# Patient Record
Sex: MALE | Race: BLACK OR AFRICAN AMERICAN | Employment: FULL TIME | URBAN - METROPOLITAN AREA
[De-identification: names, ages, dates, MRNs, and addresses within clinical notes are randomized per-mention and may not be internally consistent; named-entity substitution may affect disease eponyms.]

---

## 2021-04-14 ENCOUNTER — HOSPITAL ENCOUNTER (EMERGENCY)
Age: 56
Discharge: HOME | End: 2021-04-14
Payer: COMMERCIAL

## 2021-04-14 VITALS
HEART RATE: 78 BPM | SYSTOLIC BLOOD PRESSURE: 139 MMHG | DIASTOLIC BLOOD PRESSURE: 103 MMHG | OXYGEN SATURATION: 98 % | RESPIRATION RATE: 15 BRPM | TEMPERATURE: 98.2 F

## 2021-04-14 DIAGNOSIS — M62.838 NECK MUSCLE SPASM: Primary | ICD-10-CM

## 2021-04-14 PROCEDURE — 99285 EMERGENCY DEPT VISIT HI MDM: CPT

## 2021-04-14 PROCEDURE — 93010 ELECTROCARDIOGRAM REPORT: CPT | Performed by: INTERNAL MEDICINE

## 2021-04-14 PROCEDURE — 93005 ELECTROCARDIOGRAM TRACING: CPT | Performed by: PHYSICIAN ASSISTANT

## 2021-04-14 RX ORDER — KETOROLAC TROMETHAMINE 30 MG/ML
15 INJECTION, SOLUTION INTRAMUSCULAR; INTRAVENOUS ONCE
Status: DISCONTINUED | OUTPATIENT
Start: 2021-04-14 | End: 2021-04-14

## 2021-04-14 RX ORDER — ORPHENADRINE CITRATE 100 MG/1
100 TABLET, EXTENDED RELEASE ORAL 2 TIMES DAILY
Qty: 20 TABLET | Refills: 0 | Status: SHIPPED | OUTPATIENT
Start: 2021-04-14 | End: 2021-04-24

## 2021-04-14 RX ORDER — KETOROLAC TROMETHAMINE 10 MG/1
10 TABLET, FILM COATED ORAL EVERY 6 HOURS PRN
Qty: 20 TABLET | Refills: 0 | Status: SHIPPED | OUTPATIENT
Start: 2021-04-14 | End: 2022-04-14

## 2021-04-14 NOTE — ED PROVIDER NOTES
Patient Identification  Jayda Trevizo is a 60 y.o. male    Chief Complaint  Neck Pain (since midnight,no injury)      HPI  (History provided by patient)  This is a 60 y.o. male who was brought in by self for chief complaint of neck pain. Onset is acute on chronic, patient states he has had episodes similar to this for quite some time, this episode began around midnight. He was awake when it started. Is located in the left posterior neck. Worse with lifting left arm behind head and with turning head left and right. States it feels tight. Has been trying massage at home without relief. Has not tried any medications. Denies numbness or weakness. No chest pain. No difficulty breathing. No sore throat. REVIEW OF SYSTEMS    Constitutional:  Denies fever, chills  HENT:  Denies sore throat or ear pain. + neck pain  Eyes: Denies vision changes, eye pain. Cardiovascular:  Denies chest pain, syncope  Respiratory:  Denies shortness of breath, cough   GI:  Denies abdominal pain, nausea, vomiting  :  Denies dysuria, discharge  Musculoskeletal:  Denies back pain, joint pain  Skin:  Denies rash, pruritis  Neurologic:  Denies headache, focal weakness, or sensory changes     See HPI and nursing notes for additional information     I have reviewed the following nursing documentation:  Allergies: No Known Allergies    Past medical history:  has a past medical history of Asthma. Past surgical history:  has a past surgical history that includes cyst removal.    Home medications:   Prior to Admission medications    Medication Sig Start Date End Date Taking?  Authorizing Provider   orphenadrine (NORFLEX) 100 MG extended release tablet Take 1 tablet by mouth 2 times daily for 10 days 4/14/21 4/24/21 Yes Darci Gaming PA-C   ketorolac (TORADOL) 10 MG tablet Take 1 tablet by mouth every 6 hours as needed for Pain 4/14/21 4/14/22 Yes Darci Gaming PA-C   naproxen (NAPROSYN) 500 MG tablet Take 1 tablet by mouth 2 times daily 10/26/17   [facility]   Misc. Devices ([facility]) MISC 1 Package by Does not apply route daily 10/26/17   [facility]   albuterol (PROVENTIL HFA;VENTOLIN HFA) 108 (90 BASE) MCG/ACT inhaler Inhale 2 puffs into the lungs every 6 hours as needed. Historical Provider, MD       Social history:  reports that he has never smoked. He has never used smokeless tobacco. He reports that he does not drink alcohol or use drugs. Family history:  History reviewed. No pertinent family history. Exam  BP (!) 139/103   Pulse 78   Temp 98.2 °F (36.8 °C) (Oral)   Resp 15   SpO2 98%   Nursing note and vitals reviewed. Constitutional: Well developed, well nourished. No acute distress. HENT:      Head: Normocephalic and atraumatic. Ears: External ears normal.      Nose: Nose normal.     Mouth: Membrane mucosa moist and pink. No posterior oropharynx erythema or tonsillar edema  Eyes: Anicteric sclera. No discharge, PERRL  Neck: Supple. Trachea midline. Cardiovascular: RRR, no murmurs, rubs, or gallops, radial pulses 2+ bilaterally. Pulmonary/Chest: Effort normal. No respiratory distress. CTAB. No stridor. No wheezes. No rales. Abdominal: Soft. Nontender to palpation. No distension. No guarding, rebound tenderness, or evidence of ascites. : No CVA tenderness. Musculoskeletal: Moves all extremities. No gross deformity. No tenderness palpation of the cervical, thoracic, lumbar spine. There is mild muscle tightness and swelling consistent with muscular spasm noted in the left trapezius, patient's pain is reproduced with palpation of the left trapezius. No rash. Neurological: Alert and oriented to person, place, and time. Normal muscle tone.      - High Sensitivity Neuro Exam Cervical Spine   C1-C4 - Sensation back of head and neck - Intact bilaterally   C5 - Deltoid - 5/5 bilaterally   C6 - Biceps - 5/5 bilaterally   C7 - Extend wrist/fingers - 5/5 bilaterally   C8 - Flex fingers - 5/5 bilaterally   T1 - Abduct fingers - 5/5 bilaterally   Skin: Warm and dry. No rash. Psychiatric: Normal mood and affect. Behavior is normal.    Procedures      EKG   Please see Dr. Mukund Cantu' note for EKG read. Radiographs (if obtained):  [] The following radiograph was interpreted by myself in the absence of a radiologist:   [x] Radiologist's Report Reviewed:  No orders to display          Labs  Results for orders placed or performed during the hospital encounter of 04/14/21   EKG 12 Lead   Result Value Ref Range    Ventricular Rate 66 BPM    Atrial Rate 66 BPM    P-R Interval 162 ms    QRS Duration 102 ms    Q-T Interval 400 ms    QTc Calculation (Bazett) 419 ms    P Axis 57 degrees    R Axis 24 degrees    T Axis 32 degrees    Diagnosis       Normal sinus rhythm  Possible Left atrial enlargement  Borderline ECG  No previous ECGs available           MDM  Patient presents for neck pain. He has had this previously but this is worse than usual and not going away. This appears to be musculoskeletal on exam, he has muscle spasm, pain is worse with range of motion of the neck and lifting of the left arm. He has no chest pain. Given his age and the fact that no trauma was related with his pain we did obtain EKG which shows normal sinus rhythm. Patient declined medication in ED. Requested advised on how to relieve neck pain which was provided including heat therapy, gentle stretching and massage. Was willing to take medications if these fail and was given prescription for Norflex and ketorolac. I estimate there is LOW risk for a ANAPHYLAXIS, ESOPHAGEAL FOREIGN BODY, DEEP SPACE INFECTION, EPIGLOTTITIS, PERITONSILLAR ABSCESS, MENINGITIS, AIRWAY COMPROMISE, ANGIOEDEMA, thus I consider the discharge disposition reasonable. Bonifacio Small and I have discussed the diagnosis and risks, and we agree with discharging home to follow-up with their primary doctor.  We also discussed returning to the Emergency Department immediately if new or worsening symptoms occur. We have discussed the symptoms which are most concerning (e.g., changing or worsening pain, trouble swallowing or breathing, neck stiffness or fever) that necessitate immediate return. I have independently evaluated this patient. Final Impression  1. Neck muscle spasm        Blood pressure (!) 139/103, pulse 78, temperature 98.2 °F (36.8 °C), temperature source Oral, resp. rate 15, SpO2 98 %. Disposition:  Discharge to home in stable condition. Patient was given scripts for the following medications. I counseled patient how to take these medications. New Prescriptions    KETOROLAC (TORADOL) 10 MG TABLET    Take 1 tablet by mouth every 6 hours as needed for Pain    ORPHENADRINE (NORFLEX) 100 MG EXTENDED RELEASE TABLET    Take 1 tablet by mouth 2 times daily for 10 days       This chart was generated using the [address] [place] [address] [address] dictation system. I created this record but it may contain dictation errors given the limitations of this technology.        Frances Williamson PA-C  04/14/21 1707

## 2021-04-14 NOTE — ED PROVIDER NOTES
EKG:   Normal sinus rhythm with a rate of 66. [place] interval 162, , QTc 419. No ST elevations or depressions. Normal T waves. Questionable left atrial enlargement. Impression: Nonspecific EKG. No previous EKGs for comparison.       Ana Lamar MD  04/14/21 2957

## 2021-04-14 NOTE — LETTER
[facility] Emergency Department  [address] [place] [address]  Phone: [phone]  Fax: [phone]             April 14, 2021    Patient: Ara Scott   YOB: 1965   Date of Visit: 4/14/2021       To Whom It May Concern:    Julio Cesar Worley was seen and treated in our emergency department on 4/14/2021. He may return to work on 4/16/21.       Sincerely,     Nely Medina RN        Signature:__________________________________

## 2021-04-16 LAB
EKG ATRIAL RATE: 66 BPM
EKG DIAGNOSIS: NORMAL
EKG P AXIS: 57 DEGREES
EKG P-R INTERVAL: 162 MS
EKG Q-T INTERVAL: 400 MS
EKG QRS DURATION: 102 MS
EKG QTC CALCULATION (BAZETT): 419 MS
EKG R AXIS: 24 DEGREES
EKG T AXIS: 32 DEGREES
EKG VENTRICULAR RATE: 66 BPM

## 2022-01-06 ENCOUNTER — HOSPITAL ENCOUNTER (EMERGENCY)
Age: 57
Discharge: HOME | End: 2022-01-06
Attending: EMERGENCY MEDICINE
Payer: COMMERCIAL

## 2022-01-06 VITALS
RESPIRATION RATE: 16 BRPM | HEART RATE: 87 BPM | SYSTOLIC BLOOD PRESSURE: 152 MMHG | DIASTOLIC BLOOD PRESSURE: 102 MMHG | TEMPERATURE: 99.4 F | OXYGEN SATURATION: 97 %

## 2022-01-06 DIAGNOSIS — R50.9 FEVER, UNSPECIFIED: Primary | ICD-10-CM

## 2022-01-06 LAB
SARS-COV-2, NAAT: NOT DETECTED
SOURCE: NORMAL

## 2022-01-06 PROCEDURE — 99282 EMERGENCY DEPT VISIT SF MDM: CPT

## 2022-01-06 PROCEDURE — 87635 SARS-COV-2 COVID-19 AMP PRB: CPT

## 2022-01-06 ASSESSMENT — ENCOUNTER SYMPTOMS
COUGH: 1
BACK PAIN: 0
ABDOMINAL PAIN: 0
EYE PAIN: 0
SHORTNESS OF BREATH: 0
SINUS PAIN: 0
VOMITING: 0
NAUSEA: 0
RHINORRHEA: 0
EYE DISCHARGE: 0

## 2022-01-06 NOTE — ED TRIAGE NOTES
Pt arrives ambulatory through triage with chief complaint of fever. Pt sts he checked his temp at home and it was 108.0F. Pt temp on arrival 99.4. Pt also complaining of cough. Denies other complaints.

## 2022-01-06 NOTE — ED PROVIDER NOTES
[address] ENCOUNTER      Pt Name: Luz Gr  MRN: [id number]  [place] 1965  Date of evaluation: 1/6/2022  Provider: Alan Steele MD    CHIEF COMPLAINT       Chief Complaint   Patient presents with    Fever     fever at home, temp 99.4 on arrival         [facility]      Luz Gr is a 60 y.o. male who presents to the emergency department  for   Chief Complaint   Patient presents with    Fever     fever at home, temp 99.4 on arrival       60 yom presents reporting fever. Presents with last 2 days he has had a mild cough. He has endorsing congestion. Denies any known sick contacts. Reports he took his temperature at home with multiple thermometers and it rated 100.8. He became concerned to come to the emergency department for evaluation. Denies any dysuria. No abdominal pain. No vomiting or diarrhea. Denies any sputum production. No difficulty breathing. GCS of 15 in the emergency department. No signs of respiratory distress. He was afebrile upon presentation. He reports he did not take any antipyretics at home. He does endorse that he did put ice on his head after he noted the fever. Nursing Notes, Triage Notes & Vital Signs were reviewed. REVIEW OF SYSTEMS    (2-9 systems for level 4, 10 or more for level 5)     Review of Systems   Constitutional: Positive for fever. Negative for chills. HENT: Positive for congestion. Negative for rhinorrhea and sinus pain. Eyes: Negative for pain and discharge. Respiratory: Positive for cough. Negative for shortness of breath. Cardiovascular: Negative for chest pain and palpitations. Gastrointestinal: Negative for abdominal pain, nausea and vomiting. Endocrine: Negative for polydipsia and polyuria. Genitourinary: Negative for dysuria and flank pain. Musculoskeletal: Negative for back pain and neck pain.    Skin: Negative for pallor and wound. Neurological: Negative for dizziness, facial asymmetry, light-headedness, numbness and headaches. Psychiatric/Behavioral: Negative for confusion. Except as noted above the remainder of the review of systems was reviewed and negative. PAST MEDICAL HISTORY     Past Medical History:   Diagnosis Date    Asthma        Prior to Admission medications    Medication Sig Start Date End Date Taking? Authorizing Provider   ketorolac (TORADOL) 10 MG tablet Take 1 tablet by mouth every 6 hours as needed for Pain 4/14/21 4/14/22  Nichelle Gaming PA-C   naproxen (NAPROSYN) 500 MG tablet Take 1 tablet by mouth 2 times daily 10/26/17   [address] Mic[address] Reeves PA-C   Misc. Devices ([facility]) MISC 1 Package by Does not apply route daily 10/26/17   [address] Reeves PA-C   albuterol (PROVENTIL HFA;VENTOLIN HFA) 108 (90 BASE) MCG/ACT inhaler Inhale 2 puffs into the lungs every 6 hours as needed. Historical Provider, MD        Patient Active Problem List   Diagnosis    Sebaceous cyst         SURGICAL HISTORY       Past Surgical History:   Procedure Laterality Date    CYST REMOVAL           CURRENT MEDICATIONS       Previous Medications    ALBUTEROL (PROVENTIL HFA;VENTOLIN HFA) 108 (90 BASE) MCG/ACT INHALER    Inhale 2 puffs into the lungs every 6 hours as needed. KETOROLAC (TORADOL) 10 MG TABLET    Take 1 tablet by mouth every 6 hours as needed for Pain    MISC. DEVICES (CRUTCHES-ALUMINUM) MISC    1 Package by Does not apply route daily    NAPROXEN (NAPROSYN) 500 MG TABLET    Take 1 tablet by mouth 2 times daily       ALLERGIES     Patient has no known allergies. FAMILY HISTORY     History reviewed. No pertinent family history.        SOCIAL HISTORY       Social History     Socioeconomic History    Marital status: Single     Spouse name: None    Number of children: None    Years of education: None    Highest education level: None   Occupational History    None   Tobacco Use    Smoking status: Never Smoker    Smokeless tobacco: Never Used   Substance and Sexual Activity    Alcohol use: No    Drug use: No    Sexual activity: None   Other Topics Concern    None   Social History Narrative    None     Social Determinants of Health     Financial Resource Strain:     Difficulty of Paying Living Expenses: Not on file   Food Insecurity:     Worried About Running Out of Food in the Last Year: Not on file    Connie of Food in the Last Year: Not on file   Transportation Needs:     Lack of Transportation (Medical): Not on file    Lack of Transportation (Non-Medical): Not on file   Physical Activity:     Days of Exercise per Week: Not on file    Minutes of Exercise per Session: Not on file   Stress:     Feeling of Stress : Not on file   Social Connections:     Frequency of Communication with Friends and Family: Not on file    Frequency of Social Gatherings with Friends and Family: Not on file    Attends Gnosticism Services: Not on file    Active Member of [address] [place] [address] or Organizations: Not on file    Attends Club or Organization Meetings: Not on file    Marital Status: Not on file   Intimate Partner Violence:     Fear of Current or Ex-Partner: Not on file    Emotionally Abused: Not on file    Physically Abused: Not on file    Sexually Abused: Not on file   Housing Stability:     Unable to Pay for Housing in the Last Year: Not on file    Number of [place] in the Last Year: Not on file    Unstable Housing in the Last Year: Not on file       SCREENINGS               PHYSICAL EXAM    (up to 7 for level 4, 8 or more for level 5)     ED Triage Vitals [01/06/22 0128]   BP Temp Temp Source Pulse Resp SpO2 Height Weight   (!) 152/102 99.4 °F (37.4 °C) Oral 87 16 97 % -- --       Physical Exam  Vitals reviewed. Constitutional:       Appearance: He is not ill-appearing or toxic-appearing. HENT:      Head: Normocephalic and atraumatic. Nose: No congestion or rhinorrhea. Mouth/Throat:      Mouth: Mucous membranes are moist.      Pharynx: No oropharyngeal exudate or posterior oropharyngeal erythema. Eyes:      General:         Right eye: No discharge. Left eye: No discharge. Extraocular Movements: Extraocular movements intact. Pupils: Pupils are equal, round, and reactive to light. Cardiovascular:      Rate and Rhythm: Normal rate. Heart sounds: No friction rub. No gallop. Pulmonary:      Effort: Pulmonary effort is normal. No respiratory distress. Chest:      Chest wall: No tenderness. Abdominal:      Palpations: Abdomen is soft. Tenderness: There is no abdominal tenderness. There is no guarding. Musculoskeletal:         General: No tenderness. Normal range of motion. Cervical back: Normal range of motion and neck supple. No tenderness. Right lower leg: No edema. Left lower leg: No edema. Lymphadenopathy:      Cervical: No cervical adenopathy. Skin:     General: Skin is warm. Capillary Refill: Capillary refill takes less than 2 seconds. Neurological:      General: No focal deficit present. Mental Status: He is alert and oriented to person, place, and time. DIAGNOSTIC RESULTS     Labs Reviewed   COVID-19, RAPID        RADIOLOGY:     Non-plain film images such as CT, Ultrasound and MRI are read by the radiologist. Plain radiographic images are visualized and preliminarily interpreted by the emergency physician. Interpretation per the Radiologist below, if available at the time of this note:    No orders to display         ED BEDSIDE ULTRASOUND:   Performed by ED Physician Moraima Diaz MD       LABS:  Labs Reviewed   COVID-19, RAPID       All other labs were within normal range or not returned as of this dictation.     EMERGENCY DEPARTMENT COURSE and DIFFERENTIAL DIAGNOSIS/MDM:   Vitals:    Vitals:    01/06/22 0128   BP: (!) 152/102   Pulse: 87   Resp: 16   Temp: 99.4 °F (37.4 °C)   TempSrc: Oral   SpO2: 97%           MDM  Number of Diagnoses or Management Options  Fever, unspecified  Diagnosis management comments: 66-year-old male presents reporting fever. He states that he has had some congestion mild cough for the last few days. Took a temperature at home today and it read 100.8. He came to the emergency department for evaluation. He did endorse applying ice to himself after he noted the high temperature. Presents the emergency department elevated blood pressure. Temperature the emergency department is 99.4. His oxygen saturations are in the high 90s on room air. No signs respiratory distress. We did obtain a Covid-19 test and is negative. Results discussed with patient. We discussed that his symptoms seem consistent with a viral illness. He will continue symptomatic treatment at home for any additional fevers. He is amatory without difficulty. No signs of respiratory distress. He is discharged in stable condition. Amount and/or Complexity of Data Reviewed  Clinical lab tests: reviewed        -  Patient seen and evaluated in the emergency department. -  Triage and nursing notes reviewed and incorporated. -  Old chart records reviewed and incorporated. -  Work-up included:  See above  -  Results discussed with patient. CONSULTS:  None    PROCEDURES:  None performed unless otherwise noted below     Procedures        FINAL IMPRESSION      1. Fever, unspecified          DISPOSITION/PLAN   DISPOSITION Decision To Discharge 01/06/2022 02:37:23 AM      PATIENT REFERRED TO:  Dhiraj Niño MD  [address]  [phone]    Schedule an appointment as soon as possible for a visit in 1 week  As needed      DISCHARGE MEDICATIONS:  New Prescriptions    No medications on file       ED Provider Disposition Time  DISPOSITION Decision To Discharge 01/06/2022 02:37:23 AM      Appropriate personal protective equipment was worn during the patient's evaluation. These included surgical, eye protection, surgical mask or in 95 respirator and gloves. The patient was also placed in a surgical mask for the prevention of possible spread of respiratory viral illnesses. The Patient was instructed to read the package inserts with any medication that was prescribed. Major potential reactions and medication interactions were discussed. The Patient understands that there are numerous possible adverse reactions not covered. The patient was also instructed to arrange follow-up with his or her primary care provider for review of any pending labwork or incidental findings on any radiology results that were obtained. All efforts were made to discuss any incidental findings that require further monitoring. Controlled Substances Monitoring:     No flowsheet data found.     (Please note that portions of this note were completed with a voice recognition program.  Efforts were made to edit the dictations but occasionally words are mis-transcribed.)    Johanna Powell MD (electronically signed)  Attending Emergency Physician           Johanna Powell MD  01/06/22 9992

## 2022-01-08 ENCOUNTER — APPOINTMENT (OUTPATIENT)
Dept: CT IMAGING | Age: 57
End: 2022-01-08
Payer: COMMERCIAL

## 2022-01-08 ENCOUNTER — APPOINTMENT (OUTPATIENT)
Dept: GENERAL RADIOLOGY | Age: 57
End: 2022-01-08
Payer: COMMERCIAL

## 2022-01-08 ENCOUNTER — HOSPITAL ENCOUNTER (EMERGENCY)
Age: 57
Discharge: HOME | End: 2022-01-09
Attending: EMERGENCY MEDICINE
Payer: COMMERCIAL

## 2022-01-08 VITALS
DIASTOLIC BLOOD PRESSURE: 102 MMHG | SYSTOLIC BLOOD PRESSURE: 154 MMHG | HEIGHT: 68 IN | OXYGEN SATURATION: 97 % | RESPIRATION RATE: 16 BRPM | TEMPERATURE: 98 F | WEIGHT: 185 LBS | BODY MASS INDEX: 28.04 KG/M2 | HEART RATE: 84 BPM

## 2022-01-08 DIAGNOSIS — M79.10 MYALGIA: ICD-10-CM

## 2022-01-08 DIAGNOSIS — J40 BRONCHITIS: Primary | ICD-10-CM

## 2022-01-08 DIAGNOSIS — E86.0 DEHYDRATION: ICD-10-CM

## 2022-01-08 DIAGNOSIS — M54.6 ACUTE BILATERAL THORACIC BACK PAIN: ICD-10-CM

## 2022-01-08 LAB
ALBUMIN SERPL-MCNC: 3.7 GM/DL (ref 3.4–5)
ALP BLD-CCNC: 65 IU/L (ref 40–129)
ALT SERPL-CCNC: 29 U/L (ref 10–40)
ANION GAP SERPL CALCULATED.3IONS-SCNC: 12 MMOL/L (ref 4–16)
AST SERPL-CCNC: 28 IU/L (ref 15–37)
BASOPHILS ABSOLUTE: 0 K/CU MM
BASOPHILS RELATIVE PERCENT: 0.5 % (ref 0–1)
BILIRUB SERPL-MCNC: 0.3 MG/DL (ref 0–1)
BUN BLDV-MCNC: 11 MG/DL (ref 6–23)
CALCIUM SERPL-MCNC: 9.1 MG/DL (ref 8.3–10.6)
CHLORIDE BLD-SCNC: 99 MMOL/L (ref 99–110)
CO2: 24 MMOL/L (ref 21–32)
CREAT SERPL-MCNC: 1 MG/DL (ref 0.9–1.3)
DIFFERENTIAL TYPE: ABNORMAL
EOSINOPHILS ABSOLUTE: 0.1 K/CU MM
EOSINOPHILS RELATIVE PERCENT: 1.7 % (ref 0–3)
GFR AFRICAN AMERICAN: >60 ML/MIN/1.73M2
GFR NON-AFRICAN AMERICAN: >60 ML/MIN/1.73M2
GLUCOSE BLD-MCNC: 147 MG/DL (ref 70–99)
HCT VFR BLD CALC: 52 % (ref 42–52)
HEMOGLOBIN: 16.6 GM/DL (ref 13.5–18)
IMMATURE NEUTROPHIL %: 0.2 % (ref 0–0.43)
LACTATE: 2.4 MMOL/L (ref 0.4–2)
LACTATE: 2.7 MMOL/L (ref 0.4–2)
LYMPHOCYTES ABSOLUTE: 1.5 K/CU MM
LYMPHOCYTES RELATIVE PERCENT: 23.2 % (ref 24–44)
MCH RBC QN AUTO: 28.1 PG (ref 27–31)
MCHC RBC AUTO-ENTMCNC: 31.9 % (ref 32–36)
MCV RBC AUTO: 88.1 FL (ref 78–100)
MONOCYTES ABSOLUTE: 0.6 K/CU MM
MONOCYTES RELATIVE PERCENT: 9.6 % (ref 0–4)
NUCLEATED RBC %: 0 %
PDW BLD-RTO: 13 % (ref 11.7–14.9)
PLATELET # BLD: 170 K/CU MM (ref 140–440)
PMV BLD AUTO: 11.5 FL (ref 7.5–11.1)
POTASSIUM SERPL-SCNC: 4.5 MMOL/L (ref 3.5–5.1)
PRO-BNP: 8.14 PG/ML
RAPID INFLUENZA  B AGN: NEGATIVE
RAPID INFLUENZA A AGN: NEGATIVE
RBC # BLD: 5.9 M/CU MM (ref 4.6–6.2)
SEGMENTED NEUTROPHILS ABSOLUTE COUNT: 4.2 K/CU MM
SEGMENTED NEUTROPHILS RELATIVE PERCENT: 64.8 % (ref 36–66)
SODIUM BLD-SCNC: 135 MMOL/L (ref 135–145)
TOTAL IMMATURE NEUTOROPHIL: 0.01 K/CU MM
TOTAL NUCLEATED RBC: 0 K/CU MM
TOTAL PROTEIN: 6.9 GM/DL (ref 6.4–8.2)
TROPONIN T: <0.01 NG/ML
WBC # BLD: 6.5 K/CU MM (ref 4–10.5)

## 2022-01-08 PROCEDURE — 6360000004 HC RX CONTRAST MEDICATION: Performed by: EMERGENCY MEDICINE

## 2022-01-08 PROCEDURE — 71045 X-RAY EXAM CHEST 1 VIEW: CPT

## 2022-01-08 PROCEDURE — 85025 COMPLETE CBC W/AUTO DIFF WBC: CPT

## 2022-01-08 PROCEDURE — 83605 ASSAY OF LACTIC ACID: CPT

## 2022-01-08 PROCEDURE — 99283 EMERGENCY DEPT VISIT LOW MDM: CPT

## 2022-01-08 PROCEDURE — 87804 INFLUENZA ASSAY W/OPTIC: CPT

## 2022-01-08 PROCEDURE — 71275 CT ANGIOGRAPHY CHEST: CPT

## 2022-01-08 PROCEDURE — 2580000003 HC RX 258: Performed by: EMERGENCY MEDICINE

## 2022-01-08 PROCEDURE — 96361 HYDRATE IV INFUSION ADD-ON: CPT

## 2022-01-08 PROCEDURE — 84484 ASSAY OF TROPONIN QUANT: CPT

## 2022-01-08 PROCEDURE — 93005 ELECTROCARDIOGRAM TRACING: CPT | Performed by: PHYSICIAN ASSISTANT

## 2022-01-08 PROCEDURE — 83880 ASSAY OF NATRIURETIC PEPTIDE: CPT

## 2022-01-08 PROCEDURE — 96360 HYDRATION IV INFUSION INIT: CPT

## 2022-01-08 PROCEDURE — 80053 COMPREHEN METABOLIC PANEL: CPT

## 2022-01-08 RX ORDER — 0.9 % SODIUM CHLORIDE 0.9 %
1000 INTRAVENOUS SOLUTION INTRAVENOUS ONCE
Status: COMPLETED | OUTPATIENT
Start: 2022-01-08 | End: 2022-01-08

## 2022-01-08 RX ADMIN — IOPAMIDOL 90 ML: 755 INJECTION, SOLUTION INTRAVENOUS at 22:27

## 2022-01-08 RX ADMIN — SODIUM CHLORIDE 1000 ML: 9 INJECTION, SOLUTION INTRAVENOUS at 19:47

## 2022-01-08 ASSESSMENT — PAIN DESCRIPTION - ORIENTATION: ORIENTATION: RIGHT;LEFT

## 2022-01-08 ASSESSMENT — PAIN SCALES - GENERAL: PAINLEVEL_OUTOF10: 6

## 2022-01-08 ASSESSMENT — PAIN DESCRIPTION - PAIN TYPE: TYPE: ACUTE PAIN

## 2022-01-08 ASSESSMENT — PAIN DESCRIPTION - DESCRIPTORS: DESCRIPTORS: BURNING

## 2022-01-08 ASSESSMENT — PAIN DESCRIPTION - LOCATION: LOCATION: BACK;ARM

## 2022-01-08 NOTE — ED NOTES
Patient unhappy about wait time for continued treatment. Questions this nurse in regards to results and plan of care. Mayra DÍAZ notified and reports plan to update patient. [address]  Marcie Rosales  01/08/22 2769

## 2022-01-08 NOTE — ED PROVIDER NOTES
As provider-in-triage, I performed a medical screening history and physical exam on this patient. HISTORY OF PRESENT ILLNESS  Lory Yanes is a 60 y.o. male who presents for several days of body aches, joint pain, axillary pain, fever. Seen here on 1/6, had neg rapid covid, does not want covid test repeated. PHYSICAL EXAM  There were no vitals taken for this visit. On exam, the patient appears well-hydrated, well-nourished, and in no acute distress. Mucous membranes are moist. Speech is clear. Breathing is unlabored. Skin is dry. Mental status is normal. Moves all extremities, and is without facial droop. Comment: Please note this report has been produced using speech recognition software and may contain errors related to that system including errors in grammar, punctuation, and spelling, as well as words and phrases that may be inappropriate. If there are any questions or concerns please feel free to contact the dictating provider for clarification.        Keshawn Chavez PA-C  01/08/22 9660

## 2022-01-08 NOTE — ED TRIAGE NOTES
Patient complains of burning pain intermittently in back and bilateral arms as well as axillary area.

## 2022-01-08 NOTE — ED PROVIDER NOTES
EKG:   Sinus tachycardia with a rate of 117. IN interval 146, QRS 88, QTc 443. No ST elevations or depressions. Normal T waves. Q waves in the inferior leads. Impression: Abnormal EKG. When compared to previous EKG from 4/4/2021, the tachycardia is new, otherwise no significant changes.      Wendy Rosen MD  01/08/22 2103 Procedure(s):  COLONOSCOPY 20.    total IV anesthesia    Anesthesia Post Evaluation      Multimodal analgesia: multimodal analgesia used between 6 hours prior to anesthesia start to PACU discharge  Patient location during evaluation: bedside  Patient participation: complete - patient participated  Level of consciousness: awake  Pain management: adequate  Airway patency: patent  Anesthetic complications: no  Cardiovascular status: acceptable and stable  Respiratory status: acceptable and room air  Hydration status: acceptable  Post anesthesia nausea and vomiting:  none      INITIAL Post-op Vital signs:   Vitals Value Taken Time   /61 7/3/2020  9:35 AM   Temp 37 °C (98.6 °F) 7/3/2020  9:25 AM   Pulse 70 7/3/2020  9:35 AM   Resp 16 7/3/2020  9:35 AM   SpO2 100 % 7/3/2020  9:35 AM

## 2022-01-09 RX ORDER — PREDNISONE 50 MG/1
50 TABLET ORAL DAILY
Qty: 5 TABLET | Refills: 0 | Status: SHIPPED | OUTPATIENT
Start: 2022-01-09 | End: 2022-01-14

## 2022-01-09 RX ORDER — AZITHROMYCIN 250 MG/1
250 TABLET, FILM COATED ORAL DAILY
Qty: 1 PACKET | Refills: 0 | Status: SHIPPED | OUTPATIENT
Start: 2022-01-09 | End: 2022-01-19

## 2022-01-09 NOTE — ED PROVIDER NOTES
EMERGENCY DEPARTMENT ENCOUNTER      CHIEF COMPLAINT:   Cough  Body aches  Upper back pain      HPI: Shari Cardona is a 60 y.o. male who presents to the emergency department complaining of intermittent fevers, chills, body aches, joint pain, upper back pain and neck cough. The patient was seen here with similar symptoms on 1/6/2021. He had a rapid COVID test that was negative at that time. He is continued to have symptoms. His cough is intermittent and mildly productive. He has right upper back pain that radiates into the right arm. It feels burning in nature. The pain is constant. It is worse with movement and better with rest.  He has generalized body aches. He denies chest pain or hemoptysis. He denies shortness of breath. He denies leg pain or swelling. He denies any other complaints. REVIEW OF SYSTEMS:   Constitutional: See HPI  Eyes:  Denies change in visual acuity  HENT:  Denies nasal congestion or sore throat  Respiratory: See HPI  Cardiovascular:  Denies chest pain or edema  GI:  Denies abdominal pain, nausea, vomiting, bloody stools or diarrhea  :  Denies dysuria  Musculoskeletal: See HPI  Integument:  Denies rash  Neurologic:  Denies headache, focal weakness or sensory changes  \"Remaining review of systems reviewed and negative. I have reviewed the nursing triage documentation and agree unless otherwise noted below. \"      PAST MEDICAL HISTORY:   Past Medical History:   Diagnosis Date    Asthma        CURRENT MEDICATIONS:   Home medications reviewed. SURGICAL HISTORY:   Past Surgical History:   Procedure Laterality Date    CYST REMOVAL         FAMILY HISTORY:   No family history on file.     SOCIAL HISTORY:   Social History     Socioeconomic History    Marital status: Single     Spouse name: Not on file    Number of children: Not on file    Years of education: Not on file    Highest education level: Not on file   Occupational History    Not on file   Tobacco Use    Smoking status: Never Smoker    Smokeless tobacco: Never Used   Substance and Sexual Activity    Alcohol use: No    Drug use: No    Sexual activity: Not on file   Other Topics Concern    Not on file   Social History Narrative    Not on file     Social Determinants of Health     Financial Resource Strain:     Difficulty of Paying Living Expenses: Not on file   Food Insecurity:     Worried About Running Out of Food in the Last Year: Not on file    Connie of Food in the Last Year: Not on file   Transportation Needs:     Lack of Transportation (Medical): Not on file    Lack of Transportation (Non-Medical): Not on file   Physical Activity:     Days of Exercise per Week: Not on file    Minutes of Exercise per Session: Not on file   Stress:     Feeling of Stress : Not on file   Social Connections:     Frequency of Communication with Friends and Family: Not on file    Frequency of Social Gatherings with Friends and Family: Not on file    Attends [employer] Services: Not on file    Active Member of [address] [place] [address] or Organizations: Not on file    Attends Club or Organization Meetings: Not on file    Marital Status: Not on file   Intimate Partner Violence:     Fear of Current or Ex-Partner: Not on file    Emotionally Abused: Not on file    Physically Abused: Not on file    Sexually Abused: Not on file   Housing Stability:     Unable to Pay for Housing in the Last Year: Not on file    Number of [place] in the Last Year: Not on file    Unstable Housing in the Last Year: Not on file       ALLERGIES: Patient has no known allergies. PHYSICAL EXAM:  VITAL SIGNS:   ED Triage Vitals [01/08/22 1509]   Enc Vitals Group      BP (!) 154/102      Pulse 120      Resp 16      Temp 98 °F (36.7 °C)      Temp Source Oral      SpO2 97 %      Weight 185 lb (83.9 kg)      Height 5' 8\" (1.727 m)      Head Circumference       Peak Flow       Pain Score       Pain Loc       Pain Edu? Excl. in [address]?       Constitutional: Non-toxic appearance  HENT: Normocephalic, Atraumatic, Bilateral external ears normal, Oropharynx moist, No oral exudates, Nose normal.  Eyes:  PERRL, Conjunctiva normal, No discharge. Neck: Normal range of motion, No tenderness, Supple, No stridor, No lymphadenopathy. Cardiovascular: Mildly tachycardic, regular rhythm  Pulmonary/Chest:  Normal breath sounds, No respiratory distress, No wheezing, intermittent cough  Abdomen: Bowel sounds normal, Soft, No tenderness, No masses, No pulsatile masses  Back: Right upper back tenderness to palpation, no midline tenderness, no rash, no step-offs  Extremities:  Normal range of motion, Intact distal pulses, No edema, No tenderness  Neurologic:  Alert & oriented x 3, Normal motor function, Sensation intact to light touch throughout, No focal deficits  Skin:  Warm, Dry, No erythema, No rash      EKG Interpretation  Interpreted by me  Compared to 4/14/2021  Rhythm: sinus tachycardia  Rate: tachycardic 117  Axis: normal  Ectopy: none  Conduction: normal  ST Segments: no acute change  T Waves: no acute change  Clinical Impression: sinus tachycardia    Cardiac Monitor Strip Interpretation  Interpreted by me  Monitor strip interpreted for greater than 10 seconds  Rhythm: sinus tachycardia  Rate: tachycardic  Ectopy: none  ST Segments: normal      Radiology / Procedures:  CTA PULMONARY W CONTRAST (Final result)  Result time 01/08/22 22:52:20  Final result by Aida Gutierrez (01/08/22 22:52:20)                Impression:    No evidence of pulmonary embolism or acute pulmonary abnormality. RECOMMENDATIONS:   Unavailable             Narrative:    EXAMINATION:   CTA OF THE CHEST 1/8/2022 10:27 pm     TECHNIQUE:   CTA of the chest was performed after the administration of intravenous   contrast.  Multiplanar reformatted images are provided for review.  MIP   images are provided for review.  Dose modulation, iterative reconstruction,   and/or weight based adjustment of the mA/kV was utilized to reduce the   radiation dose to as low as reasonably achievable. COMPARISON:   None. HISTORY:   ORDERING SYSTEM PROVIDED HISTORY: Back pain, Tachycardia   TECHNOLOGIST PROVIDED HISTORY:   Reason for exam:->Back pain, Tachycardia   Decision Support Exception - unselect if not a suspected or confirmed   emergency medical condition->Emergency Medical Condition (MA)   Reason for Exam: Back pain, Tachycardia     FINDINGS:   Pulmonary Arteries: Pulmonary arteries are adequately opacified for   evaluation.  No evidence of intraluminal filling defect to suggest pulmonary   embolism.  Main pulmonary artery is normal in caliber. Mediastinum: No evidence of mediastinal lymphadenopathy.  The heart and   pericardium demonstrate no acute abnormality.  There is no thoracic aortic   aneurysm or dissection. .     Lungs/pleura: The lungs are without acute process.  No focal consolidation or   pulmonary edema.  No evidence of pleural effusion or pneumothorax. Upper Abdomen: Motion degrades the upper abdomen without an obvious fluid   collection. .     Soft Tissues/Bones: No acute bone or soft tissue abnormality.                         XR CHEST PORTABLE (Final result)  Result time 01/08/22 16:05:01  Final result by Cornelius Ocampo MD (01/08/22 16:05:01)                Impression:    No acute pulmonary process. Narrative:    EXAMINATION:   ONE XRAY VIEW OF THE CHEST     1/8/2022 3:36 pm     COMPARISON:   Chest radiograph 2012.      HISTORY:   ORDERING SYSTEM PROVIDED HISTORY: Axillary pain, body aches, fevers   TECHNOLOGIST PROVIDED HISTORY:   Reason for exam:->Axillary pain, body aches, fevers   Reason for Exam: Axillary pain, body aches, fevers   Additional signs and symptoms: none   Relevant Medical/Surgical History: none     FINDINGS:   Single view provided.  Normal mediastinal and cardiac silhouettes.  Hilar   remote healed granulomatous disease.  Normal lung volumes.  No acute   consolidation or effusion.  No pneumothorax or free subdiaphragmatic air.  No   pulmonary mass or free subdiaphragmatic air.  The visualized bowel gas   pattern is normal.  No focal body wall soft tissue abnormality. Labs Reviewed   CBC WITH AUTO DIFFERENTIAL - Abnormal; Notable for the following components:       Result Value    MCHC 31.9 (*)     MPV 11.5 (*)     Lymphocytes % 23.2 (*)     Monocytes % 9.6 (*)     All other components within normal limits   COMPREHENSIVE METABOLIC PANEL W/ REFLEX TO MG FOR LOW K - Abnormal; Notable for the following components:    Glucose 147 (*)     All other components within normal limits   LACTIC ACID, PLASMA - Abnormal; Notable for the following components:    Lactate 2.7 (*)     All other components within normal limits   LACTIC ACID, PLASMA - Abnormal; Notable for the following components:    Lactate 2.4 (*)     All other components within normal limits   RAPID INFLUENZA A/B ANTIGENS   TROPONIN   BRAIN NATRIURETIC PEPTIDE       ED COURSE & MEDICAL DECISION MAKING:  Pertinent Labs & Imaging studies reviewed. (See chart for details)  On exam, the patient is afebrile and nontoxic appearing. He is tachycardic in the 120s. He is otherwise hemodynamically stable and neurologically intact. EKG shows sinus tachycardia with no ST elevation or depression. Labs are obtained and are significant for an elevated lactic acid which improved with IV fluids. He is negative for influenza. There are no other clinically significant lab abnormalities. The patient declines retesting for COVID. Chest x-ray is negative. CTA of the chest is negative. The patient was treated with 1 L of IV normal saline. His lactic acid improved. His tachycardia resolved. I suspect that the patient has bronchitis versus viral URI. I suspect that his back pain and myalgias are secondary to the viral syndrome. The patient is also dehydrated, but has improved with IV rehydration. . I have a low suspicion for STEMI, PE, pneumothorax, pneumonia or sepsis. I feel that the patient is stable for outpatient management follow up in 2-3 days. He would like a Z-Nagi as he has a history of asthma and states that his doctor has given these to him in the past with improvement. I discussed with him that it is likely a viral illness and that the Z-Nagi will not help. However, the patient would still like the prescription. I will also prescribe steroids for URI in the setting of asthma. The patient is given return precautions. The patient verbalized understanding, was agreeable with plan, and the patient was discharged home in stable condition. Clinical Impression:  1. Bronchitis    2. Acute bilateral thoracic back pain    3. Myalgia    4. Dehydration        Disposition referral (if applicable): Grey Esquivel MD  [address] [address] [address] [facility]  [phone]    Schedule an appointment as soon as possible for a visit       [facility] Emergency Department  [address] [phone]  [phone]  Go to   If symptoms worsen      Disposition medications (if applicable):  Discharge Medication List as of 1/9/2022 12:04 AM      START taking these medications    Details   predniSONE (DELTASONE) 50 MG tablet Take 1 tablet by mouth daily for 5 days, Disp-5 tablet, R-0Normal      azithromycin (ZITHROMAX) 250 MG tablet Take 1 tablet by mouth daily for 10 days, Disp-1 packet, R-0Normal               Comment: Please note this report has been produced using speech recognition software and may contain errors related to that system including errors in grammar, punctuation, and spelling, as well as words and phrases that may be inappropriate. If there are any questions or concerns please feel free to contact the dictating provider for clarification.         Cecy Geronimo MD  01/09/22 1470

## 2022-01-10 LAB
EKG ATRIAL RATE: 117 BPM
EKG DIAGNOSIS: NORMAL
EKG P AXIS: 55 DEGREES
EKG P-R INTERVAL: 146 MS
EKG Q-T INTERVAL: 318 MS
EKG QRS DURATION: 88 MS
EKG QTC CALCULATION (BAZETT): 443 MS
EKG R AXIS: 53 DEGREES
EKG T AXIS: 43 DEGREES
EKG VENTRICULAR RATE: 117 BPM

## 2022-01-10 PROCEDURE — 93010 ELECTROCARDIOGRAM REPORT: CPT | Performed by: INTERNAL MEDICINE

## 2022-03-08 ENCOUNTER — APPOINTMENT (OUTPATIENT)
Dept: GENERAL RADIOLOGY | Age: 57
End: 2022-03-08
Payer: COMMERCIAL

## 2022-03-08 ENCOUNTER — HOSPITAL ENCOUNTER (EMERGENCY)
Age: 57
Discharge: HOME | End: 2022-03-08
Attending: EMERGENCY MEDICINE
Payer: COMMERCIAL

## 2022-03-08 VITALS
BODY MASS INDEX: 28.13 KG/M2 | OXYGEN SATURATION: 96 % | HEIGHT: 68 IN | RESPIRATION RATE: 19 BRPM | DIASTOLIC BLOOD PRESSURE: 83 MMHG | TEMPERATURE: 98.2 F | HEART RATE: 91 BPM | SYSTOLIC BLOOD PRESSURE: 123 MMHG

## 2022-03-08 DIAGNOSIS — I48.0 PAROXYSMAL ATRIAL FIBRILLATION WITH RAPID VENTRICULAR RESPONSE (HCC): Primary | ICD-10-CM

## 2022-03-08 LAB
ALBUMIN SERPL-MCNC: 4.3 GM/DL (ref 3.4–5)
ALP BLD-CCNC: 58 IU/L (ref 40–129)
ALT SERPL-CCNC: 32 U/L (ref 10–40)
ANION GAP SERPL CALCULATED.3IONS-SCNC: 15 MMOL/L (ref 4–16)
AST SERPL-CCNC: 33 IU/L (ref 15–37)
BASOPHILS ABSOLUTE: 0.1 K/CU MM
BASOPHILS RELATIVE PERCENT: 0.9 % (ref 0–1)
BILIRUB SERPL-MCNC: 0.3 MG/DL (ref 0–1)
BUN BLDV-MCNC: 15 MG/DL (ref 6–23)
CALCIUM SERPL-MCNC: 9.7 MG/DL (ref 8.3–10.6)
CHLORIDE BLD-SCNC: 99 MMOL/L (ref 99–110)
CO2: 22 MMOL/L (ref 21–32)
CREAT SERPL-MCNC: 1.2 MG/DL (ref 0.9–1.3)
DIFFERENTIAL TYPE: ABNORMAL
EOSINOPHILS ABSOLUTE: 0.3 K/CU MM
EOSINOPHILS RELATIVE PERCENT: 3.1 % (ref 0–3)
GFR AFRICAN AMERICAN: >60 ML/MIN/1.73M2
GFR NON-AFRICAN AMERICAN: >60 ML/MIN/1.73M2
GLUCOSE BLD-MCNC: 217 MG/DL (ref 70–99)
HCT VFR BLD CALC: 50.3 % (ref 42–52)
HEMOGLOBIN: 16.3 GM/DL (ref 13.5–18)
IMMATURE NEUTROPHIL %: 0.3 % (ref 0–0.43)
LYMPHOCYTES ABSOLUTE: 2 K/CU MM
LYMPHOCYTES RELATIVE PERCENT: 19.3 % (ref 24–44)
MCH RBC QN AUTO: 28 PG (ref 27–31)
MCHC RBC AUTO-ENTMCNC: 32.4 % (ref 32–36)
MCV RBC AUTO: 86.4 FL (ref 78–100)
MONOCYTES ABSOLUTE: 0.8 K/CU MM
MONOCYTES RELATIVE PERCENT: 7.3 % (ref 0–4)
NUCLEATED RBC %: 0 %
PDW BLD-RTO: 13.5 % (ref 11.7–14.9)
PLATELET # BLD: 200 K/CU MM (ref 140–440)
PMV BLD AUTO: 11.3 FL (ref 7.5–11.1)
POTASSIUM SERPL-SCNC: 3.8 MMOL/L (ref 3.5–5.1)
PRO-BNP: 42.35 PG/ML
RBC # BLD: 5.82 M/CU MM (ref 4.6–6.2)
SEGMENTED NEUTROPHILS ABSOLUTE COUNT: 7.2 K/CU MM
SEGMENTED NEUTROPHILS RELATIVE PERCENT: 69.1 % (ref 36–66)
SODIUM BLD-SCNC: 136 MMOL/L (ref 135–145)
T4 FREE: 1.01 NG/DL (ref 0.9–1.8)
TOTAL IMMATURE NEUTOROPHIL: 0.03 K/CU MM
TOTAL NUCLEATED RBC: 0 K/CU MM
TOTAL PROTEIN: 7.2 GM/DL (ref 6.4–8.2)
TROPONIN T: <0.01 NG/ML
TSH HIGH SENSITIVITY: 0.68 UIU/ML (ref 0.27–4.2)
WBC # BLD: 10.5 K/CU MM (ref 4–10.5)

## 2022-03-08 PROCEDURE — 93005 ELECTROCARDIOGRAM TRACING: CPT | Performed by: EMERGENCY MEDICINE

## 2022-03-08 PROCEDURE — 84439 ASSAY OF FREE THYROXINE: CPT

## 2022-03-08 PROCEDURE — 99285 EMERGENCY DEPT VISIT HI MDM: CPT

## 2022-03-08 PROCEDURE — 83880 ASSAY OF NATRIURETIC PEPTIDE: CPT

## 2022-03-08 PROCEDURE — 84484 ASSAY OF TROPONIN QUANT: CPT

## 2022-03-08 PROCEDURE — 80053 COMPREHEN METABOLIC PANEL: CPT

## 2022-03-08 PROCEDURE — 84443 ASSAY THYROID STIM HORMONE: CPT

## 2022-03-08 PROCEDURE — 71045 X-RAY EXAM CHEST 1 VIEW: CPT

## 2022-03-08 PROCEDURE — 85025 COMPLETE CBC W/AUTO DIFF WBC: CPT

## 2022-03-08 RX ORDER — 0.9 % SODIUM CHLORIDE 0.9 %
1000 INTRAVENOUS SOLUTION INTRAVENOUS ONCE
Status: DISCONTINUED | OUTPATIENT
Start: 2022-03-08 | End: 2022-03-09 | Stop reason: HOSPADM

## 2022-03-08 RX ORDER — ASPIRIN 325 MG
325 TABLET ORAL DAILY
Qty: 30 TABLET | Refills: 0 | Status: SHIPPED | OUTPATIENT
Start: 2022-03-08

## 2022-03-08 RX ORDER — DILTIAZEM HYDROCHLORIDE 5 MG/ML
5 INJECTION INTRAVENOUS ONCE
Status: DISCONTINUED | OUTPATIENT
Start: 2022-03-08 | End: 2022-03-09 | Stop reason: HOSPADM

## 2022-03-08 NOTE — ED PROVIDER NOTES
Triage Chief Complaint:   Chest Pain and Shortness of Breath    [place]:  Alannah Oshea is a 60 y.o. male that presents with acute onset of palpitations, elevated heart rate and shortness of breath. Denies sharp chest pain. Mild chest pressure. Pt has a hx of asthma and has has similar episodes in the past that resolved after using his inhaler. No wheezing. Pt was no doing anything strenuous when symptoms started. He does not take any daily medications. He denies any recent illness. No fevers. No lightheadedness or dizziness. No nausea or vomiting. No lower extremity swelling, no recent long car trips, no history of blood clots, no exogenous hormone use, no recent surgeries or hospitalizations. States he did not sleep well last night but has not noticed any other recent changes. He states he ate roasted chicken for dinner and is unsure if this contributed to his symptoms. He denies any alcohol or drug use. Patient follows with cardiologist, Dr. Kindra Cruz. ROS:   Review of Systems   Constitutional: Negative for chills, diaphoresis and fever. HENT: Negative for congestion, rhinorrhea and sore throat. Eyes: Negative for redness and visual disturbance. Respiratory: Positive for chest tightness and shortness of breath. Negative for cough and wheezing. Cardiovascular: Positive for palpitations. Negative for chest pain and leg swelling. Gastrointestinal: Negative for abdominal pain, nausea and vomiting. Genitourinary: Negative for dysuria and frequency. Musculoskeletal: Negative for arthralgias and back pain. Skin: Negative for rash and wound. Neurological: Negative for dizziness, syncope, light-headedness and headaches. Psychiatric/Behavioral: Negative for hallucinations and suicidal ideas. Past Medical History:   Diagnosis Date    Asthma      Past Surgical History:   Procedure Laterality Date    CYST REMOVAL       History reviewed. No pertinent family history.   Social History Socioeconomic History    Marital status: Single     Spouse name: Not on file    Number of children: Not on file    Years of education: Not on file    Highest education level: Not on file   Occupational History    Not on file   Tobacco Use    Smoking status: Never Smoker    Smokeless tobacco: Never Used   Substance and Sexual Activity    Alcohol use: No    Drug use: No    Sexual activity: Not on file   Other Topics Concern    Not on file   Social History Narrative    Not on file     Social Determinants of Health     Financial Resource Strain:     Difficulty of Paying Living Expenses: Not on file   Food Insecurity:     Worried About Running Out of Food in the Last Year: Not on file    Connie of Food in the Last Year: Not on file   Transportation Needs:     Lack of Transportation (Medical): Not on file    Lack of Transportation (Non-Medical): Not on file   Physical Activity:     Days of Exercise per Week: Not on file    Minutes of Exercise per Session: Not on file   Stress:     Feeling of Stress : Not on file   Social Connections:     Frequency of Communication with Friends and Family: Not on file    Frequency of Social Gatherings with Friends and Family: Not on file    Attends Cheondoism Services: Not on file    Active Member of [address] [place] [address] [employer] travel or Organizations: Not on file    Attends Club or Organization Meetings: Not on file    Marital Status: Not on file   Intimate Partner Violence:     Fear of Current or Ex-Partner: Not on file    Emotionally Abused: Not on file    Physically Abused: Not on file    Sexually Abused: Not on file   Housing Stability:     Unable to Pay for Housing in the Last Year: Not on file    Number of [place] in the Last Year: Not on file    Unstable Housing in the Last Year: Not on file     No current facility-administered medications for this encounter.      Current Outpatient Medications   Medication Sig Dispense Refill    aspirin (GEOFF ASPIRIN) 325 MG tablet Take 1 tablet by mouth daily 30 tablet 0    ketorolac (TORADOL) 10 MG tablet Take 1 tablet by mouth every 6 hours as needed for Pain 20 tablet 0    naproxen (NAPROSYN) 500 MG tablet Take 1 tablet by mouth 2 times daily 30 tablet 0    Misc. Devices ([address] [place] [employer]) MISC 1 Package by Does not apply route daily 2 each 0    albuterol (PROVENTIL HFA;VENTOLIN HFA) 108 (90 BASE) MCG/ACT inhaler Inhale 2 puffs into the lungs every 6 hours as needed. No Known Allergies    Nursing Notes Reviewed     Physical Exam:   ED Triage Vitals   Enc Vitals Group      BP       Pulse       Resp       Temp       Temp src       SpO2       Weight       Height       Head Circumference       Peak Flow       Pain Score       Pain Loc       Pain Edu? Excl. in [address]? /83   Pulse 91   Temp 98.2 °F (36.8 °C)   Resp 19   Ht 5' 8\" (1.727 m)   SpO2 96%   BMI 28.13 kg/m²   My pulse ox interpretation is - normal  Physical Exam  Vitals and nursing note reviewed. Constitutional:       General: He is not in acute distress. Appearance: Normal appearance. He is not diaphoretic. HENT:      Head: Normocephalic and atraumatic. Eyes:      General:         Right eye: No discharge. Left eye: No discharge. Conjunctiva/sclera: Conjunctivae normal.   Cardiovascular:      Rate and Rhythm: Regular rhythm. Tachycardia present. Pulses: Normal pulses. Radial pulses are 2+ on the right side and 2+ on the left side. Pulmonary:      Effort: Pulmonary effort is normal. No respiratory distress. Breath sounds: Normal breath sounds. No stridor. No wheezing, rhonchi or rales. Abdominal:      General: There is no distension. Tenderness: There is no abdominal tenderness. There is no guarding or rebound. Musculoskeletal:         General: No swelling or tenderness. Normal range of motion. Skin:     General: Skin is warm and dry. Neurological:      General: No focal deficit present. Mental Status: He is alert. Cranial Nerves: No cranial nerve deficit.    Psychiatric:         Mood and Affect: Mood normal.         Behavior: Behavior normal.         I have reviewed and interpreted all of the currently available lab results from this visit (if applicable):  Results for orders placed or performed during the hospital encounter of 03/08/22   CBC with Auto Differential   Result Value Ref Range    WBC 10.5 4.0 - 10.5 K/CU MM    RBC 5.82 4.6 - 6.2 M/CU MM    Hemoglobin 16.3 13.5 - 18.0 GM/DL    Hematocrit 50.3 42 - 52 %    MCV 86.4 78 - 100 FL    MCH 28.0 27 - 31 PG    MCHC 32.4 32.0 - 36.0 %    RDW 13.5 11.7 - 14.9 %    Platelets 064 693 - 951 K/CU MM    MPV 11.3 (H) 7.5 - 11.1 FL    Differential Type AUTOMATED DIFFERENTIAL     Segs Relative 69.1 (H) 36 - 66 %    Lymphocytes % 19.3 (L) 24 - 44 %    Monocytes % 7.3 (H) 0 - 4 %    Eosinophils % 3.1 (H) 0 - 3 %    Basophils % 0.9 0 - 1 %    Segs Absolute 7.2 K/CU MM    Lymphocytes Absolute 2.0 K/CU MM    Monocytes Absolute 0.8 K/CU MM    Eosinophils Absolute 0.3 K/CU MM    Basophils Absolute 0.1 K/CU MM    Nucleated RBC % 0.0 %    Total Nucleated RBC 0.0 K/CU MM    Total Immature Neutrophil 0.03 K/CU MM    Immature Neutrophil % 0.3 0 - 0.43 %   Comprehensive Metabolic Panel w/ Reflex to MG   Result Value Ref Range    Sodium 136 135 - 145 MMOL/L    Potassium 3.8 3.5 - 5.1 MMOL/L    Chloride 99 99 - 110 mMol/L    CO2 22 21 - 32 MMOL/L    BUN 15 6 - 23 MG/DL    CREATININE 1.2 0.9 - 1.3 MG/DL    Glucose 217 (H) 70 - 99 MG/DL    Calcium 9.7 8.3 - 10.6 MG/DL    Albumin 4.3 3.4 - 5.0 GM/DL    Total Protein 7.2 6.4 - 8.2 GM/DL    Total Bilirubin 0.3 0.0 - 1.0 MG/DL    ALT 32 10 - 40 U/L    AST 33 15 - 37 IU/L    Alkaline Phosphatase 58 40 - 129 IU/L    GFR Non-African American >60 >60 mL/min/1.73m2    GFR African American >60 >60 mL/min/1.73m2    Anion Gap 15 4 - 16   Troponin   Result Value Ref Range    Troponin T <0.010 <0.01 NG/ML   Brain Natriuretic Peptide   Result Value Ref Range    Pro-BNP 42.35 <300 PG/ML   T4, Free   Result Value Ref Range    T4 Free 1.01 0.9 - 1.8 NG/DL   TSH   Result Value Ref Range    TSH, High Sensitivity 0.681 0.270 - 4.20 uIu/ml   EKG 12 Lead   Result Value Ref Range    Ventricular Rate 158 BPM    Atrial Rate 150 BPM    QRS Duration 90 ms    Q-T Interval 282 ms    QTc Calculation (Bazett) 457 ms    R Axis 45 degrees    T Axis 14 degrees    Diagnosis       Supraventricular tachycardia  Cannot rule out Anterior infarct , age undetermined  Abnormal ECG  When compared with ECG of 08-MAR-2022 18:41,  No significant change was found  Confirmed by Ryann Sosa MD [facility] ([id number]) on 3/11/2022 6:31:51 AM     EKG 12 Lead   Result Value Ref Range    Ventricular Rate 146 BPM    Atrial Rate 133 BPM    QRS Duration 80 ms    Q-T Interval 292 ms    QTc Calculation (Bazett) 455 ms    R Axis 81 degrees    T Axis 23 degrees    Diagnosis       Supraventricular tachycardia  Otherwise normal ECG  When compared with ECG of 08-JAN-2022 15:17,  No significant change was found  Confirmed by Ryann Sosa MD [facility] ([id number]) on 3/11/2022 6:31:33 AM     EKG 12 Lead   Result Value Ref Range    Ventricular Rate 109 BPM    Atrial Rate 109 BPM    P-R Interval 166 ms    QRS Duration 82 ms    Q-T Interval 332 ms    QTc Calculation (Bazett) 447 ms    P Axis 44 degrees    R Axis 29 degrees    T Axis 32 degrees    Diagnosis       Sinus tachycardia  Possible Left atrial enlargement  Cannot rule out Anterior infarct (cited on or before 08-MAR-2022)  Abnormal ECG  When compared with ECG of 08-MAR-2022 19:33,  No significant change was found  Confirmed by Ryann Sosa MD [facility] ([id number]) on 3/11/2022 6:35:41 AM        Radiographs (if obtained):  [] The following radiograph was interpreted by myself in the absence of a radiologist:  [x]Radiologist's Report Reviewed:  XR CHEST PORTABLE   Final Result      1. No acute cardiopulmonary abnormality.                EKG (if obtained): (All EKG's are interpreted by myself in the absence of a cardiologist)  Undetermined rhythm, atrial flutter versus SVT. Rate of 146. QRS 80, QTc 455. No ST elevations or depressions. Nonspecific T waves. Impression: Abnormal EKG. When compared to previous EKG from 1/8/2022, heart rate is significantly increased and the abnormal rhythm is new. MDM:  Differential diagnoses considered include but are not limited to paroxysmal atrial flutter, SVT, electrolyte abnormality, hyperthyroidism, pulmonary embolism, anxiety, acute coronary syndrome. Initial EKG is abnormal.  His heart rate is 146. On the monitor the heart rate goes up and down. Given the bradycardia and no P waves and concern for possible atrial flutter versus atrial fibrillation. EKG was obtained and is more concerning for atrial flutter. I do not suspect SVT. Patient initially refused IV. Attempted vagal removers which did slow his heart rate down some but then it sped back up again. Patient blood pressure is actually slightly elevated. No hypotension. Normal mentation. Patient did allow us to draw blood work. Basic labs are unremarkable. Normal electrolytes. Troponin is normal.  He denies any sharp chest pain just a tightness and pressure in his chest.  Low suspicion for acute coronary syndrome. Thyroid studies are within normal limits. Chest x-ray shows no acute cardiopulmonary abnormalities. While I was speaking with patient in the room, he spontaneously converted back to sinus rhythm. He is still in sinus tachycardia. Repeat EKG:  Sinus tachycardia with a rate of 109. NM interval 166, QRS 82, QTc 447. No ST elevations or depressions. Nonspecific T wave in lead III. Possible left atrial enlargement. Impression: Abnormal EKG. When this was compared to EKG done on previous emergency room visit there are no significant changes. Patient has minimal risk factors for pulmonary embolism.   I have a low suspicion for this though. I did discuss possible further testing with patient, he is feeling better would like to be discharged home with his declined any further testing for pulmonary embolism. Given patient's previous history of similar symptoms and what appears to be atrial flutter on EKG I suspect he has paroxysmal atrial flutter. We will discharge him home in stable condition but will start him on 325 aspirin daily as his CHADS2 score is 0. We will discharge him home at this time in stable condition. Recommended close follow-up with his cardiologist and immediate return to the emergency department if he has any further symptoms. Plan of care explained to patient. Concerning signs and symptoms warranting a return visit to the Emergency Department were explained in detail. All questions and concerns were addressed to the patient's satisfaction. Patient understood and agreed with plan. I did don appropriate PPE (including face mask, protective eye ware/safety glasses and gloves), as recommended by the health facility/national standard best practice, during my bedside interactions with the patient. The likelihood of other entities in the differential is insufficient to justify any further testing for them. This was explained to the patient. The patient was advised that persistent or worsening symptoms would requirefurther evaluation. Clinical Impression:  1. Paroxysmal atrial fibrillation with rapid ventricular response (HCC)          Monica Glover MD       Please note that portions of this note may have been complete with a voice recognition program.  Effortswere made to edit the dictations, but occasional words are mis-transcribed.           Monica Glover MD  03/15/22 0351

## 2022-03-09 NOTE — ED NOTES
Pt blowing through straw at this time, HR slowing but speeds back up after exhalation. Dr. Umm Arizmendi at bedside.      Warden Audrey RN  03/08/22 2033

## 2022-03-11 LAB
EKG ATRIAL RATE: 109 BPM
EKG ATRIAL RATE: 133 BPM
EKG ATRIAL RATE: 150 BPM
EKG DIAGNOSIS: NORMAL
EKG P AXIS: 44 DEGREES
EKG P-R INTERVAL: 166 MS
EKG Q-T INTERVAL: 282 MS
EKG Q-T INTERVAL: 292 MS
EKG Q-T INTERVAL: 332 MS
EKG QRS DURATION: 80 MS
EKG QRS DURATION: 82 MS
EKG QRS DURATION: 90 MS
EKG QTC CALCULATION (BAZETT): 447 MS
EKG QTC CALCULATION (BAZETT): 455 MS
EKG QTC CALCULATION (BAZETT): 457 MS
EKG R AXIS: 29 DEGREES
EKG R AXIS: 45 DEGREES
EKG R AXIS: 81 DEGREES
EKG T AXIS: 14 DEGREES
EKG T AXIS: 23 DEGREES
EKG T AXIS: 32 DEGREES
EKG VENTRICULAR RATE: 109 BPM
EKG VENTRICULAR RATE: 146 BPM
EKG VENTRICULAR RATE: 158 BPM

## 2022-03-11 PROCEDURE — 93010 ELECTROCARDIOGRAM REPORT: CPT | Performed by: INTERNAL MEDICINE

## 2022-03-15 ASSESSMENT — ENCOUNTER SYMPTOMS
BACK PAIN: 0
NAUSEA: 0
RHINORRHEA: 0
SORE THROAT: 0
EYE REDNESS: 0
CHEST TIGHTNESS: 1
COUGH: 0
WHEEZING: 0
VOMITING: 0
SHORTNESS OF BREATH: 1
ABDOMINAL PAIN: 0

## 2025-03-10 ENCOUNTER — TELEPHONE (OUTPATIENT)
Dept: PHYSICAL MEDICINE AND REHAB | Age: 60
End: 2025-03-10

## 2025-05-06 ENCOUNTER — PROCEDURE VISIT (OUTPATIENT)
Dept: PHYSICAL MEDICINE AND REHAB | Age: 60
End: 2025-05-06
Payer: COMMERCIAL

## 2025-05-06 DIAGNOSIS — R20.2 PARESTHESIA OF BILATERAL LEGS: ICD-10-CM

## 2025-05-06 DIAGNOSIS — G57.53 TARSAL TUNNEL SYNDROME OF BOTH LOWER EXTREMITIES: Primary | ICD-10-CM

## 2025-05-06 PROCEDURE — 95886 MUSC TEST DONE W/N TEST COMP: CPT | Performed by: PHYSICAL MEDICINE & REHABILITATION

## 2025-05-06 PROCEDURE — 95911 NRV CNDJ TEST 9-10 STUDIES: CPT | Performed by: PHYSICAL MEDICINE & REHABILITATION

## 2025-05-13 NOTE — PROGRESS NOTES
EMG REPORT     CHIEF COMPLAINT: Numbness, tingling and burning in both feet.    HISTORY OF PRESENT ILLNESS: 59 y.o. right hand dominant male with progressive difficulty with burning, stinging and numbness in both feet, more so on the right sole.  He described rupturing his right Achilles tendon in 2017 demonstrating some tumbling/gymnastics.  He eventually recovered from that injury but still feels weak when doing toe raises on the right.  In the recent months he has had increasing difficulty with sensitivity of each foot, primarily on the soles and primarily on the right.  He rated the pain severity as 7-8/10.  The symptoms do disturb his sleep at times.  He does not sense that he is scuffing his feet or tripping with movements recently.  He did not report consistent back pain radiating towards his legs.  He has been labeled as prediabetic by his PCP.  No history of any thyroid irregularities.      PHYSICAL EXAMINATION: Alert.  Normal lumbar lordosis without apparent paraspinal muscle tenderness to touch.  Hip and knee passive range of motion was free and full.  Straight leg raising was negative.  He guards against ankle jerk testing bilaterally.  Knee jerks were 2+ and symmetric.  There was no atrophy, tremor or clonus noted.  Sensation was distorted over the soles of each foot.  He had mild discomfort at the right ankle with passive dorsiflexion.  There was no swelling or discoloration present.      NERVE CONDUCTION STUDIES:     MOTOR         LATENCY NORMAL AMPLITUDE DISTANCE COND. SORAYA.   R  PERONEAL   3.7 < 6.2 msec 6.1 8 cm 49           RIGHT  TIBIAL 3.8 < 6.2 msec 2.4 8 cm 63           R TIB H REFLEX Absent < 50 msec      L TIB H REFLEX 31.7 < 50 msec         SENSORY  ANTIDROMIC        LATENCY NORMAL AMPLITUDE DISTANCE   R SUP PERONEAL 2.8 < 3.6 msec 18 10 cm       10 cm   RIGHT  SURAL 2.7 < 4.0 msec 9 14 cm   LEFT  SURAL 31 < 4.0 msec 13 14 cm       Right medial plantar sensory: Absent.  Right lateral plantar

## 2025-06-09 ENCOUNTER — TELEPHONE (OUTPATIENT)
Dept: PHYSICAL MEDICINE AND REHAB | Age: 60
End: 2025-06-09

## 2025-06-09 NOTE — TELEPHONE ENCOUNTER
Patient came to our office and requested EMG report so that he can sign up for social security. When I gave him the report, he had no further questions and left.    Spiral Flap Text: The defect edges were debeveled with a #15 scalpel blade.  Given the location of the defect, shape of the defect and the proximity to free margins a spiral flap was deemed most appropriate.  Using a sterile surgical marker, an appropriate rotation flap was drawn incorporating the defect and placing the expected incisions within the relaxed skin tension lines where possible. The area thus outlined was incised deep to adipose tissue with a #15 scalpel blade.  The skin margins were undermined to an appropriate distance in all directions utilizing iris scissors.